# Patient Record
Sex: MALE | Race: WHITE | Employment: FULL TIME | URBAN - METROPOLITAN AREA
[De-identification: names, ages, dates, MRNs, and addresses within clinical notes are randomized per-mention and may not be internally consistent; named-entity substitution may affect disease eponyms.]

---

## 2021-05-24 ENCOUNTER — OFFICE VISIT (OUTPATIENT)
Dept: UROLOGY | Age: 36
End: 2021-05-24
Payer: COMMERCIAL

## 2021-05-24 VITALS — SYSTOLIC BLOOD PRESSURE: 120 MMHG | WEIGHT: 185 LBS | DIASTOLIC BLOOD PRESSURE: 80 MMHG

## 2021-05-24 DIAGNOSIS — N46.9 INFERTILITY MALE: Primary | ICD-10-CM

## 2021-05-24 PROCEDURE — 1036F TOBACCO NON-USER: CPT | Performed by: NURSE PRACTITIONER

## 2021-05-24 PROCEDURE — G8421 BMI NOT CALCULATED: HCPCS | Performed by: NURSE PRACTITIONER

## 2021-05-24 PROCEDURE — G8427 DOCREV CUR MEDS BY ELIG CLIN: HCPCS | Performed by: NURSE PRACTITIONER

## 2021-05-24 PROCEDURE — 99203 OFFICE O/P NEW LOW 30 MIN: CPT | Performed by: NURSE PRACTITIONER

## 2021-05-24 ASSESSMENT — ENCOUNTER SYMPTOMS
ABDOMINAL PAIN: 0
EYE REDNESS: 0
SHORTNESS OF BREATH: 0
WHEEZING: 0
NAUSEA: 0
BACK PAIN: 0
VOMITING: 0
COUGH: 0
COLOR CHANGE: 0
CONSTIPATION: 0

## 2021-05-24 NOTE — PROGRESS NOTES
HPI:          Patient is a 30 y.o. male in no acute distress. He is alert and oriented to person, place, and time. New patient self referral for fertility concerns. He does not have a reason to be concerd with fertility. He denies any injury to his testicles or penis. He denies any prior history of pelvic or inguinal surgeries. He denies history of anabolic steroid use. He denies any issues with erectile function or ejaculatory issues. He denies any lower urinary tract symptoms. He does not smoke or use any illegal drugs. He does not take any medications. He is here because his fiancée wants him tested before they get . Past Medical History:   Diagnosis Date    Poison ivy      No past surgical history on file. No outpatient encounter medications on file as of 5/24/2021. No facility-administered encounter medications on file as of 5/24/2021. No current outpatient medications on file prior to visit. No current facility-administered medications on file prior to visit. Poison ivy extract  No family history on file. Social History     Tobacco Use   Smoking Status Former Smoker   Smokeless Tobacco Never Used       Social History     Substance and Sexual Activity   Alcohol Use Yes    Comment: social       Review of Systems   Constitutional: Negative for appetite change, chills and fever. Eyes: Negative for redness and visual disturbance. Respiratory: Negative for cough, shortness of breath and wheezing. Cardiovascular: Negative for chest pain and leg swelling. Gastrointestinal: Negative for abdominal pain, constipation, nausea and vomiting. Genitourinary: Negative for decreased urine volume, difficulty urinating, discharge, dysuria, enuresis, flank pain, frequency, hematuria, penile pain, scrotal swelling, testicular pain and urgency. Musculoskeletal: Negative for back pain, joint swelling and myalgias. Skin: Negative for color change, rash and wound. Neurological: Negative for dizziness, tremors and numbness. Hematological: Negative for adenopathy. Does not bruise/bleed easily. /80 (Site: Right Upper Arm, Position: Sitting, Cuff Size: Medium Adult)   Wt 185 lb (83.9 kg)       PHYSICAL EXAM:  Constitutional: Patient in no acute distress; Neuro: alert and oriented to person place and time. Psych: Mood and affect normal.  Skin: Normal  Lungs: Respiratory effort normal  Cardiovascular:  Normal peripheral pulses  Abdomen: Soft, non-tender, non-distended with no CVA, flank pain, hepatosplenomegaly or hernia. Kidneys normal.  Bladder non-tender and not distended. No results found for: BUN  No results found for: CREATININE  No results found for: PSA    ASSESSMENT:   Diagnosis Orders   1. Infertility male  Semen analysis         PLAN:  We will complete a semen analysis as requested and call with these results.

## 2021-05-27 ENCOUNTER — HOSPITAL ENCOUNTER (OUTPATIENT)
Age: 36
Discharge: HOME | End: 2021-05-27
Payer: COMMERCIAL

## 2021-05-27 DIAGNOSIS — N46.9 INFERTILITY MALE: ICD-10-CM

## 2021-05-27 PROCEDURE — 89320 SEMEN ANAL VOL/COUNT/MOT: CPT

## 2021-05-28 LAB
AZOOSPERMATIC CONFIRMATION: ABNORMAL
PATHOLOGIST: ABNORMAL
PROGRESSIVE MOTILITY, SPERM: 1 %
SEMEN COLOR: ABNORMAL
SEMEN CONSISTENCY: ABNORMAL
SEMEN LIQUIFICATION TIME: ABNORMAL
SEMEN MORPHOLOGY: ABNORMAL
SEMEN OTHER: ABNORMAL /HPF
SEMEN PH: 8
SEMEN TEMPERATURE: ABNORMAL
SEMEN TUBIDITY: ABNORMAL
SEMEN VOLUME: 4.5 ML
SEMEN WBC: ABNORMAL HPF
SPERM CT, SMN: <1 MILLION/ML
SPERM MOT CHECK TIME: ABNORMAL
SPERM VIABILITY: 2 %

## 2021-06-03 ENCOUNTER — OFFICE VISIT (OUTPATIENT)
Dept: UROLOGY | Age: 36
End: 2021-06-03
Payer: COMMERCIAL

## 2021-06-03 VITALS
WEIGHT: 184 LBS | TEMPERATURE: 97.7 F | HEIGHT: 70 IN | BODY MASS INDEX: 26.34 KG/M2 | DIASTOLIC BLOOD PRESSURE: 84 MMHG | HEART RATE: 65 BPM | SYSTOLIC BLOOD PRESSURE: 133 MMHG

## 2021-06-03 DIAGNOSIS — N46.9 INFERTILITY MALE: Primary | ICD-10-CM

## 2021-06-03 PROCEDURE — 99214 OFFICE O/P EST MOD 30 MIN: CPT | Performed by: NURSE PRACTITIONER

## 2021-06-03 NOTE — PATIENT INSTRUCTIONS
SURVEY:    You may be receiving a survey from [employer] regarding your visit today. Please complete the survey to enable us to provide the highest quality of care to you and your family. If you cannot score us a very good on any question, please call the office to discuss how we could of made your experience a very good one. Thank you. Schedule a Vaccine  When you qualify to receive the vaccine per the [place] guidelines, call the [facility] COVID-19 Vaccination Hotline to schedule your appointment or to get additional information about the [facility]) locations which are offering the COVID-19 vaccine. To be most effective, it's important that you receive two doses of one of the COVID-19 vaccines. -If you are receiving the [place] vaccine, your second shot will be scheduled as close to 21 days after the first shot as possible. -If you are receiving the Moderna vaccine, your second shot will be scheduled as close to 28 days after the first shot as possible. [address] Vaccination Hotline: [phone]    In partnership with [facility] and [facility] HEALTH Departments, patients can call [phone], Monday-Friday 8:00am-4:00pm for scheduling at our Hospitals. Or visit the [facility] websites for additional information of vaccine administration locations. Links to [facility]) website and Health Department websites:    [employer]-monitoring-coronavirus-covid-19/covid-19-vaccine/[place]-[email or web address]    [email or web address]    [email or web address]

## 2021-06-03 NOTE — PROGRESS NOTES
HPI:          Patient is a 30 y.o. male in no acute distress. He is alert and oriented to person, place, and time. History  2021 Self referral for fertility concerns. He does not have a reason to be concerd with fertility. He denies any injury to his testicles or penis. He denies any prior history of pelvic or inguinal surgeries. He denies history of anabolic steroid use. He denies any issues with erectile function or ejaculatory issues. He denies any lower urinary tract symptoms. He does not smoke or use any illegal drugs. He does not take any medications. He is here because his fiancée wants him tested before they get . Today  Here today to review semen analysis results. He did have adequate volume, 4.5 mL. Sperm count was less than 1, sperm viability was 2%, and progressive motility was is 1%. He again denies any prior issues with his penis or testicles. He denies any family history of cystic fibrosis. He denies any known injury, pelvic or inguinal surgeries, or history of anabolic steroid use. He does not smoke or use any illegal drugs. He does not take any medications. Past Medical History:   Diagnosis Date    Poison ivy      No past surgical history on file. No outpatient encounter medications on file as of 6/3/2021. No facility-administered encounter medications on file as of 6/3/2021. No current outpatient medications on file prior to visit. No current facility-administered medications on file prior to visit. Poison ivy extract  No family history on file. Social History     Tobacco Use   Smoking Status Former Smoker    Years: 10.00    Types: Cigarettes    Quit date: 2016    Years since quittin.4   Smokeless Tobacco Former User    Types: Chew       Social History     Substance and Sexual Activity   Alcohol Use Yes    Comment: social       Review of Systems   Constitutional: Negative for appetite change, chills and fever.    Eyes: Negative for redness and visual disturbance. Respiratory: Negative for cough, shortness of breath and wheezing. Cardiovascular: Negative for chest pain and leg swelling. Gastrointestinal: Negative for abdominal pain, constipation, nausea and vomiting. Genitourinary: Negative for decreased urine volume, difficulty urinating, discharge, dysuria, enuresis, flank pain, frequency, hematuria, penile pain, scrotal swelling, testicular pain and urgency. Musculoskeletal: Negative for back pain, joint swelling and myalgias. Skin: Negative for color change, rash and wound. Neurological: Negative for dizziness, tremors and numbness. Hematological: Negative for adenopathy. Does not bruise/bleed easily. /84 (Site: Right Upper Arm, Position: Sitting, Cuff Size: Medium Adult)   Pulse 65   Temp 97.7 °F (36.5 °C) (Temporal)   Ht 5' 10\" (1.778 m)   Wt 184 lb (83.5 kg)   BMI 26.40 kg/m²       PHYSICAL EXAM:  Constitutional: Patient in no acute distress; Neuro: alert and oriented to person place and time. Psych: Mood and affect normal.  Skin: Normal  Lungs: Respiratory effort normal  Cardiovascular:  Normal peripheral pulses  Abdomen: Soft, non-tender, non-distended with no CVA, flank pain  Bladder non-tender and not distended. Lymphatics: no palpable lymphadenopathy  Penis normal  Urethral meatus normal  Scrotal exam normal  Testicles normal bilaterally  Epididymis normal bilaterally  No evidence of inguinal hernia  Perineum normal        No results found for: BUN  No results found for: CREATININE  No results found for: PSA    ASSESSMENT:   Diagnosis Orders   1.  Infertility male  [address]    Semen Morphology         PLAN:  We will repeat semen analysis in 4-6 weeks    We will obtain a scrotal ultrasound    He will follow-up to review results

## 2021-06-08 ENCOUNTER — HOSPITAL ENCOUNTER (OUTPATIENT)
Dept: ULTRASOUND IMAGING | Age: 36
Discharge: HOME | End: 2021-06-10
Payer: COMMERCIAL

## 2021-06-08 DIAGNOSIS — N46.9 INFERTILITY MALE: ICD-10-CM

## 2021-06-08 PROCEDURE — 93976 VASCULAR STUDY: CPT

## 2021-06-10 ASSESSMENT — ENCOUNTER SYMPTOMS
WHEEZING: 0
ABDOMINAL PAIN: 0
BACK PAIN: 0
COLOR CHANGE: 0
SHORTNESS OF BREATH: 0
CONSTIPATION: 0
EYE REDNESS: 0
NAUSEA: 0
COUGH: 0
VOMITING: 0

## 2021-06-29 ENCOUNTER — TELEPHONE (OUTPATIENT)
Dept: UROLOGY | Age: 36
End: 2021-06-29

## 2021-06-29 NOTE — TELEPHONE ENCOUNTER
Left message for patient reminding him he has labs needing completed prior to urology appt. Instructed him to call the office if he had any questions.

## 2021-07-02 ENCOUNTER — HOSPITAL ENCOUNTER (OUTPATIENT)
Age: 36
Discharge: HOME | End: 2021-07-02
Payer: COMMERCIAL

## 2021-07-02 DIAGNOSIS — N46.9 INFERTILITY MALE: ICD-10-CM

## 2021-07-02 PROCEDURE — 89320 SEMEN ANAL VOL/COUNT/MOT: CPT

## 2021-07-02 PROCEDURE — 89398 UNLISTED REPROD MED LAB PROC: CPT

## 2021-07-03 LAB
AZOOSPERMATIC CONFIRMATION: NORMAL
PATHOLOGIST: NORMAL
PROGRESSIVE MOTILITY, SPERM: 50 %
SEMEN COLOR: NORMAL
SEMEN CONSISTENCY: NORMAL
SEMEN LIQUIFICATION TIME: NORMAL
SEMEN MORPHOLOGY: NORMAL
SEMEN OTHER: NORMAL /HPF
SEMEN PH: 8
SEMEN TEMPERATURE: NORMAL
SEMEN TUBIDITY: NORMAL
SEMEN VOLUME: 7 ML
SEMEN WBC: NORMAL HPF
SPERM CT, SMN: 53 MILLION/ML
SPERM MOT CHECK TIME: NORMAL
SPERM VIABILITY: NORMAL %

## 2021-07-06 ENCOUNTER — VIRTUAL VISIT (OUTPATIENT)
Dept: UROLOGY | Age: 36
End: 2021-07-06
Payer: COMMERCIAL

## 2021-07-06 DIAGNOSIS — N46.9 INFERTILITY MALE: Primary | ICD-10-CM

## 2021-07-06 PROCEDURE — 99212 OFFICE O/P EST SF 10 MIN: CPT | Performed by: NURSE PRACTITIONER

## 2021-07-06 ASSESSMENT — ENCOUNTER SYMPTOMS
BACK PAIN: 0
COUGH: 0
CONSTIPATION: 0
EYE REDNESS: 0
SHORTNESS OF BREATH: 0
NAUSEA: 0
WHEEZING: 0
ABDOMINAL PAIN: 0
VOMITING: 0
COLOR CHANGE: 0

## 2021-07-06 NOTE — PROGRESS NOTES
2021    TELEHEALTH EVALUATION -- Audio/Visual (During ONNQG-66 public health emergency)    HPI:    Lupillo Cheung (:  1985) has requested an audio/video evaluation for the following concern(s):    History  2021 Self referral for fertility concerns. He does not have a reason to be concerd with fertility. He denies any injury to his testicles or penis. He denies any prior history of pelvic or inguinal surgeries. He denies history of anabolic steroid use. He denies any issues with erectile function or ejaculatory issues. He denies any lower urinary tract symptoms. He does not smoke or use any illegal drugs. He does not take any medications. He is here because his fiancée wants him tested before they get . 2021 semen analysis: volume 4.5ml, sperm count less than 1, sperm viability was 2%, and progressive motility was 1%    Today  Here today to review semen analysis results. We did repeat a semen analysis due to prior abnormal analysis. Current semen analysis does show adequate volume, 7 mL. He also had adequate counts, 53,000,000/mL. Review of Systems   Constitutional: Negative for appetite change, chills and fever. Eyes: Negative for redness and visual disturbance. Respiratory: Negative for cough, shortness of breath and wheezing. Cardiovascular: Negative for chest pain and leg swelling. Gastrointestinal: Negative for abdominal pain, constipation, nausea and vomiting. Genitourinary: Negative for decreased urine volume, difficulty urinating, discharge, dysuria, enuresis, flank pain, frequency, hematuria, penile pain, scrotal swelling, testicular pain and urgency. Musculoskeletal: Negative for back pain, joint swelling and myalgias. Skin: Negative for color change, rash and wound. Neurological: Negative for dizziness, tremors and numbness. Hematological: Negative for adenopathy. Does not bruise/bleed easily.        Allergies   Allergen Reactions    Poison [employer] Extract      Swelling and redness       Prior to Visit Medications    Not on File       Social History     Tobacco Use    Smoking status: Former Smoker     Years: 10.00     Types: Cigarettes     Quit date: 2016     Years since quittin.5    Smokeless tobacco: Former User     Types: Chew   Vaping Use    Vaping Use: Former   Substance Use Topics    Alcohol use: Yes     Comment: social    Drug use: Never        Past Medical History:   Diagnosis Date    Poison ivy        No past surgical history on file. No family history on file. PHYSICAL EXAMINATION:    Constitutional: [x] Appears well-developed and well-nourished [x] No apparent distress      [] Abnormal-   Mental status  [x] Alert and awake  [x] Oriented to person/place/time [x]Able to follow commands      Eyes:  EOM    [x]  Normal  [] Abnormal-  Sclera  [x]  Normal  [] Abnormal -         Discharge [x]  None visible  [] Abnormal -    HENT:   [x] Normocephalic, atraumatic. [] Abnormal   [x] Mouth/Throat: Mucous membranes are moist.     External Ears [x] Normal  [] Abnormal-     Neck: [x] No visualized mass     Pulmonary/Chest: [x] Respiratory effort normal.  [x] No visualized signs of difficulty breathing or respiratory distress        [] Abnormal-      Musculoskeletal:   [x] Normal gait with no signs of ataxia         [x] Normal range of motion of neck        [] Abnormal-       Neurological:        [x] No Facial Asymmetry (Cranial nerve 7 motor function) (limited exam to video visit)          [x] No gaze palsy        [] Abnormal-         Skin:        [x] No significant exanthematous lesions or discoloration noted on facial skin         [] Abnormal-            Psychiatric:       [x] Normal Affect [x] No Hallucinations        [] Abnormal-         ASSESSMENT/PLAN:   Diagnosis Orders   1. Infertility male       There is no issues with fertility.   I do believe that the first semen analysis was abnormal because it was not processed in an adequate amount of time.  He will follow up with us on an as-needed basis. Darian Jenkins is a 30 y.o. male being evaluated by a Virtual Visit (video visit) encounter to address concerns as mentioned above. A caregiver was present when appropriate. Due to this being a TeleHealth encounter (During XUIBW-19 public health emergency), evaluation of the following organ systems was limited: Vitals/Constitutional/EENT/Resp/CV/GI//MS/Neuro/Skin/Heme-Lymph-Imm. Pursuant to the emergency declaration under the [address] [place] [address], [address] [place] [address] authority and the [employer] Act, this Virtual Visit was conducted with patient's (and/or legal guardian's) consent, to reduce the patient's risk of exposure to COVID-19 and provide necessary medical care. The patient (and/or legal guardian) has also been advised to contact this office for worsening conditions or problems, and seek emergency medical treatment and/or call 911 if deemed necessary. Services were provided through a video synchronous discussion virtually to substitute for in-person clinic visit. The patient was located in their home. The provider was located in the office. --MARSHALL Spencer - CNP on 7/6/2021 at 11:14 AM    An electronic signature was used to authenticate this note.

## 2021-07-07 LAB — SEMEN MORPHOLOGY: NORMAL
